# Patient Record
Sex: MALE | Race: WHITE | NOT HISPANIC OR LATINO | URBAN - METROPOLITAN AREA
[De-identification: names, ages, dates, MRNs, and addresses within clinical notes are randomized per-mention and may not be internally consistent; named-entity substitution may affect disease eponyms.]

---

## 2018-07-18 RX ORDER — INDOMETHACIN 50 MG/1
CAPSULE ORAL
COMMUNITY
Start: 2018-02-28 | End: 2018-07-19

## 2018-07-18 RX ORDER — CYCLOBENZAPRINE HCL 5 MG
5 TABLET ORAL
COMMUNITY
Start: 2015-08-19 | End: 2018-07-19

## 2018-07-18 RX ORDER — NYSTATIN 100000 U/G
CREAM TOPICAL
COMMUNITY
Start: 2017-06-26 | End: 2018-07-19

## 2018-07-18 RX ORDER — TAMSULOSIN HYDROCHLORIDE 0.4 MG/1
CAPSULE ORAL
COMMUNITY
Start: 2018-01-28

## 2018-07-19 ENCOUNTER — OFFICE VISIT (OUTPATIENT)
Dept: NEUROSURGERY | Facility: CLINIC | Age: 68
End: 2018-07-19

## 2018-07-19 VITALS
WEIGHT: 180.8 LBS | SYSTOLIC BLOOD PRESSURE: 110 MMHG | TEMPERATURE: 98.1 F | BODY MASS INDEX: 27.4 KG/M2 | HEIGHT: 68 IN | DIASTOLIC BLOOD PRESSURE: 68 MMHG

## 2018-07-19 DIAGNOSIS — I67.1 CAROTID-CAVERNOUS FISTULA: Primary | ICD-10-CM

## 2018-07-19 DIAGNOSIS — G89.29 CHRONIC NONINTRACTABLE HEADACHE, UNSPECIFIED HEADACHE TYPE: ICD-10-CM

## 2018-07-19 DIAGNOSIS — R51.9 CHRONIC NONINTRACTABLE HEADACHE, UNSPECIFIED HEADACHE TYPE: ICD-10-CM

## 2018-07-19 PROCEDURE — 99214 OFFICE O/P EST MOD 30 MIN: CPT | Performed by: RADIOLOGY

## 2018-07-19 RX ORDER — OMEPRAZOLE 20 MG/1
20 TABLET, DELAYED RELEASE ORAL
COMMUNITY

## 2018-07-19 RX ORDER — ALPRAZOLAM 0.25 MG/1
0.25 TABLET ORAL NIGHTLY PRN
COMMUNITY

## 2018-07-19 RX ORDER — TRAMADOL HYDROCHLORIDE 50 MG/1
50 TABLET ORAL
COMMUNITY
Start: 2018-07-09

## 2018-07-19 NOTE — PROGRESS NOTES
NAME: BOB SEGURA   DOS: 2018  : 1950  PCP: No Known Provider    Chief Complaint:    Chief Complaint   Patient presents with   • Headache     hx of indirect (low flow) CC fistula       History of Present Illness:  67 y.o. male known to the neuro interventional service, having been previously seen in consultation in  for an indirect, low-flow left carotid CC fistula.  Angiography at that time demonstrated occlusion of the superior ophthalmic vein, and there was no posterior drainage to the inferior petrosal sinus, and this prohibited definitive evaluation.  However, manipulation of the fistulous drainage during attempted embolization did result in stagnation of contrast, and the fistula progressed to spontaneous occlusion.  His symptoms of left orbital ecchymosis/injection, as well as proptosis resolved.  He's had persistent headaches which have slightly progressed over the past several years, and presents today for follow-up to exclude recurrent fistula as the etiology of his headaches.  He does not give any history of a singular headache to suggest subarachnoid or intracranial hemorrhage.    Past Medical History:  Past Medical History:   Diagnosis Date   • Diabetes mellitus (CMS/HCC)    • Gout    • Headache        Past Surgical History:  History reviewed. No pertinent surgical history.    Review of Systems:        Review of Systems   Constitutional: Negative for activity change, appetite change, chills, diaphoresis, fatigue, fever and unexpected weight change.   HENT: Negative for congestion, dental problem, drooling, ear discharge, ear pain, facial swelling, hearing loss, mouth sores, nosebleeds, postnasal drip, rhinorrhea, sinus pressure, sneezing, sore throat, tinnitus, trouble swallowing and voice change.    Eyes: Positive for pain. Negative for photophobia, discharge, redness, itching and visual disturbance.   Respiratory: Negative for apnea, cough, choking, chest tightness, shortness of  breath, wheezing and stridor.    Cardiovascular: Negative for chest pain, palpitations and leg swelling.   Gastrointestinal: Positive for abdominal pain. Negative for abdominal distention, anal bleeding, blood in stool, constipation, diarrhea, nausea, rectal pain and vomiting.   Endocrine: Negative for cold intolerance, heat intolerance, polydipsia, polyphagia and polyuria.   Genitourinary: Negative for decreased urine volume, difficulty urinating, dysuria, enuresis, flank pain, frequency, genital sores, hematuria and urgency.   Musculoskeletal: Negative for arthralgias, back pain, gait problem, joint swelling, myalgias, neck pain and neck stiffness.   Skin: Negative for color change, pallor, rash and wound.   Allergic/Immunologic: Negative for environmental allergies, food allergies and immunocompromised state.   Neurological: Positive for headaches. Negative for dizziness, tremors, seizures, syncope, facial asymmetry, speech difficulty, weakness, light-headedness and numbness.   Hematological: Negative for adenopathy. Does not bruise/bleed easily.   Psychiatric/Behavioral: Negative for agitation, behavioral problems, confusion, decreased concentration, dysphoric mood, hallucinations, self-injury, sleep disturbance and suicidal ideas. The patient is not nervous/anxious and is not hyperactive.         Medications    Current Outpatient Prescriptions:   •  ALPRAZolam (XANAX) 0.25 MG tablet, Take 0.25 mg by mouth At Night As Needed for Anxiety., Disp: , Rfl:   •  Canagliflozin (INVOKANA) 300 MG tablet, Take 300 mg by mouth., Disp: , Rfl:   •  metFORMIN (GLUCOPHAGE) 1000 MG tablet, TAKE 1 TAB BY MOUTH TWO (2) TIMES DAILY (WITH MEALS)., Disp: , Rfl:   •  omeprazole OTC (PRILOSEC OTC) 20 MG EC tablet, Take 20 mg by mouth., Disp: , Rfl:   •  tamsulosin (FLOMAX) 0.4 MG capsule 24 hr capsule, TAKE 1 CAP BY MOUTH DAILY., Disp: , Rfl:   •  traMADol (ULTRAM) 50 MG tablet, Take 50 mg by mouth., Disp: , Rfl:      Allergies:  Allergies   Allergen Reactions   • Penicillins Hives       Social Hx:  Social History   Substance Use Topics   • Smoking status: Never Smoker   • Smokeless tobacco: Never Used   • Alcohol use Yes      Comment: social       Family Hx:  Family History   Problem Relation Age of Onset   • Heart disease Mother    • Cancer Father    • Emphysema Father    • Diabetes Maternal Grandmother        Review of Imaging:  Catheter angiogram from [facility] from 2017 was reviewed, with comparison made to prior catheter angiogram at [facility] dated 9/27/2012.  The 2017 study demonstrates a persistent occlusion of the indirect, left sided CC fistula.  I do not appreciate any early opacification or venous drainage to the cavernous sinus, nor to the ophthalmic veins/inferior petrosal sinus.  No aneurysm or vascular malformation.  No new vascular abnormalities.    Physical Examination:  Vitals:    07/19/18 1452   BP: 110/68   Temp: 98.1 °F (36.7 °C)        General Appearance:   Well developed, well nourished, well groomed, alert, and cooperative.  Cardiovascular: Regular rate and rhythm. No carotid bruits.  No periorbital bruits.      Neurological examination:  Neurologic Exam     Mental Status   Oriented to person, place, and time.   Speech: speech is normal   Level of consciousness: alert    Cranial Nerves   Cranial nerves II through XII intact.     Motor Exam     Strength   Strength 5/5 throughout.     Sensory Exam   Light touch normal.     Gait, Coordination, and Reflexes     Gait  Gait: normal      Diagnoses/Plan:    Mr. Fernandes is a 67 y.o. male persistent headaches, and history of left indirect CC fistula.  I have reviewed a follow-up angiogram from 2017 which demonstrates persistent occlusion of the fistula, and no evidence of recurrence or new vascular abnormalities.  Clinically, he does not have any evidence of a recurrent CC fistula, specifically no  ecchymosis/injection of the sclera and no proptosis or cranial nerve palsy.  As such, I do not feel that a catheter angiogram would be of much utility, and Mr. KIERAN Fernandes seems to agree.  Unfortunately, I do not have a good explanation for his headaches, but the pressure sensation he feels behind his left eye may be attributable to altered venous drainage and an element of venous congestion; however, he's had repetitive ophthalmologic exams which demonstrated no elevation of intraocular pressure or changes in visual acuity.    Mr. Fernandes and family were relieved to hear that I am not concerned about a recurrent CC fistula, and they're going to follow-up with his ophthalmologist for further recommendations regarding headaches.

## 2022-03-19 PROBLEM — F33.9 RECURRENT DEPRESSION (HCC): Status: ACTIVE | Noted: 2017-12-27

## 2022-03-19 PROBLEM — E11.40 TYPE 2 DIABETES MELLITUS WITH DIABETIC NEUROPATHY (HCC): Status: ACTIVE | Noted: 2019-04-17

## 2022-03-19 PROBLEM — G44.091: Status: ACTIVE | Noted: 2017-08-07

## 2023-05-12 RX ORDER — LIDOCAINE AND PRILOCAINE 25; 25 MG/G; MG/G
CREAM TOPICAL 3 TIMES DAILY PRN
COMMUNITY
Start: 2019-08-27

## 2023-05-12 RX ORDER — ATORVASTATIN CALCIUM 10 MG/1
10 TABLET, FILM COATED ORAL DAILY
COMMUNITY

## 2023-05-12 RX ORDER — ASPIRIN 81 MG/1
TABLET, CHEWABLE ORAL DAILY
COMMUNITY
Start: 2020-04-03

## 2023-05-12 RX ORDER — AZELASTINE 1 MG/ML
2 SPRAY, METERED NASAL 2 TIMES DAILY PRN
COMMUNITY
Start: 2020-02-13

## 2023-05-12 RX ORDER — PSEUDOEPHEDRINE HCL 30 MG
100 TABLET ORAL
COMMUNITY

## 2023-05-12 RX ORDER — NYSTATIN 100000 U/G
CREAM TOPICAL 2 TIMES DAILY
COMMUNITY
Start: 2019-06-03

## 2023-05-12 RX ORDER — PROMETHAZINE HYDROCHLORIDE 25 MG/1
TABLET ORAL EVERY 8 HOURS PRN
COMMUNITY
Start: 2020-02-06

## 2023-05-12 RX ORDER — ALPRAZOLAM 0.25 MG/1
TABLET ORAL 2 TIMES DAILY PRN
COMMUNITY
Start: 2020-01-20

## 2023-05-12 RX ORDER — TIZANIDINE HYDROCHLORIDE 2 MG/1
CAPSULE, GELATIN COATED ORAL 3 TIMES DAILY
COMMUNITY

## 2023-05-12 RX ORDER — NITROGLYCERIN 0.4 MG/1
TABLET SUBLINGUAL
COMMUNITY

## 2023-05-12 RX ORDER — TAMSULOSIN HYDROCHLORIDE 0.4 MG/1
CAPSULE ORAL
COMMUNITY
Start: 2020-01-05